# Patient Record
Sex: FEMALE | Race: WHITE | NOT HISPANIC OR LATINO | Employment: FULL TIME | URBAN - METROPOLITAN AREA
[De-identification: names, ages, dates, MRNs, and addresses within clinical notes are randomized per-mention and may not be internally consistent; named-entity substitution may affect disease eponyms.]

---

## 2017-11-09 ENCOUNTER — TRANSCRIBE ORDERS (OUTPATIENT)
Dept: PHYSICAL THERAPY | Facility: CLINIC | Age: 52
End: 2017-11-09

## 2017-11-09 DIAGNOSIS — S93.401A SPRAIN OF RIGHT ANKLE, UNSPECIFIED LIGAMENT, INITIAL ENCOUNTER: Primary | ICD-10-CM

## 2017-11-10 ENCOUNTER — TREATMENT (OUTPATIENT)
Dept: PHYSICAL THERAPY | Facility: CLINIC | Age: 52
End: 2017-11-10

## 2017-11-10 DIAGNOSIS — M25.571 ACUTE RIGHT ANKLE PAIN: Primary | ICD-10-CM

## 2017-11-10 PROCEDURE — 97112 NEUROMUSCULAR REEDUCATION: CPT | Performed by: PHYSICAL THERAPIST

## 2017-11-10 PROCEDURE — 97140 MANUAL THERAPY 1/> REGIONS: CPT | Performed by: PHYSICAL THERAPIST

## 2017-11-10 PROCEDURE — 97035 APP MDLTY 1+ULTRASOUND EA 15: CPT | Performed by: PHYSICAL THERAPIST

## 2017-11-10 PROCEDURE — 97001 PR PHYS THERAPY EVALUATION: CPT | Performed by: PHYSICAL THERAPIST

## 2017-11-10 NOTE — PROGRESS NOTES
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 She heard a pop and started to swell immediately. Having a difficulty walking, stairclimbing, and squatting    Currently wearing cam walker and ace brace.     Prior ankle injury to both of her ankles with hardware in the left.       Patient Occupation: [employer] -    Precautions and Work Restrictions: currently offQuality of life: good    Pain  Current pain ratin  At best pain ratin  Quality: sharp, throbbing and dull ache  Relieving factors: ice and medications (muscle relaxer)  Aggravating factors: repetitive movement, movement, ambulation, stairs and squatting  Progression: improved    Social Support  Lives in: one-story house (one step onto porch)  Lives with: spouse    Hand dominance: right    Diagnostic Tests  X-ray: normal    Treatments  Previous treatment: medication  Current treatment: medication and physical therapy  Patient Goals  Patient goals for therapy: increased strength, return to work, return to sport/leisure activities, independence with ADLs/IADLs, decreased pain, decreased edema, improved balance and increased motion             Objective       Palpation     Right Tenderness of the anterior tibialis, lateral gastrocnemius and posterior tibialis.     Tenderness     Right Ankle/Foot   Tenderness in the anterior talofibular ligament, fibula and peroneal tendon.     Active Range of Motion     Right Ankle/Foot   Dorsiflexion (ke): 10 degrees with pain  Plantar flexion: 10 degrees with pain  Inversion: 12 degrees with pain  Eversion: 10 degrees with pain    Joint Play     Right Ankle/Foot  Hypomobile in the fibular head, proximal tibiofibular joint, distal tibiofibular joint, talocrural joint, subtalar joint, midfoot and forefoot.      Strength/Myotome Testing     Right Ankle/Foot   Dorsiflexion: 3  Plantar flexion: 3  Inversion: 3  Eversion: 3    Tests     Additional Tests Details  No special tests performed today due to pain.     Ambulation   Weight-Bearing Status   Weight-Bearing Status (Right): weight-bearing as tolerated    Assistive device used: none    Observational Gait   Gait: antalgic and asymmetric   Decreased walking speed and stride length.          Assessment & Plan     Assessment  Impairments: abnormal or restricted ROM, activity intolerance, lacks appropriate home exercise program and pain with function  Assessment details: Patient is a 51 year old female who presents with increased right ankle pain after rolling her ankle.  Upon examination she has significantly limited ankle ROM and strength deficits secondary to pain. Anterior placement of fibular head.  Increased tenderness at ATF and fibular head. Partial weight bear during standing.   Prognosis: good  Functional Limitations: carrying objects, lifting, walking, pulling, pushing, uncomfortable because of pain and standing  Plan  Therapy options: will be seen for skilled physical therapy services  Planned modality interventions: TENS, ultrasound, thermotherapy (hydrocollator packs) and cryotherapy  Planned therapy interventions: manual therapy, soft tissue mobilization, strengthening, stretching, therapeutic activities, joint mobilization, home exercise program, functional ROM exercises, flexibility and body mechanics training  Frequency: 3x week  Duration in weeks: 4  Treatment plan discussed with: patient          Therapeutic Exercise:    10     mins  [id number];     Manual 10 minutes       Ultrasound:     8     mins  28065;      Timed Treatment:   28   mins   Total Treatment:     55   mins    PT SIGNATURE: Awa Bryan, PT   DATE TREATMENT INITIATED: 11/10/2017    Initial Certification  Certification Period: 2/8/2018  I certify that the therapy services are furnished  while this patient is under my care.  The services outlined above are required by this patient, and will be reviewed every 90 days.     PHYSICIAN: HIREN Palacio      DATE:     Please sign and return via fax to [phone].. Thank you, Psychiatric Physical Therapy.

## 2017-11-14 ENCOUNTER — TREATMENT (OUTPATIENT)
Dept: PHYSICAL THERAPY | Facility: CLINIC | Age: 52
End: 2017-11-14

## 2017-11-14 DIAGNOSIS — M25.571 ACUTE RIGHT ANKLE PAIN: Primary | ICD-10-CM

## 2017-11-14 PROCEDURE — 97140 MANUAL THERAPY 1/> REGIONS: CPT | Performed by: PHYSICAL THERAPIST

## 2017-11-14 PROCEDURE — 97110 THERAPEUTIC EXERCISES: CPT | Performed by: PHYSICAL THERAPIST

## 2017-11-14 NOTE — PROGRESS NOTES
Re-Assessment / Re-Certification        Patient: Adriana Escobar   : 1965  Diagnosis/ICD-10 Code:  Acute right ankle pain [M25.571]  Referring practitioner: HIREN Palacio  Date of Initial Visit: 2017  Today's Date: 2017  Patient seen for 2 sessions      Subjective:   Adriana Escobar reports: Patient reports that she feels about 50% better.  After initial therapy session she had a loud audible pop which then allowed her to move her foot more.   Clinical Progress: improved  Home Program Compliance: Yes  Treatment has included: therapeutic exercise, manual therapy, electrical stimulation and ultrasound    Subjective   Objective       Palpation     Right Tenderness of the anterior tibialis, lateral gastrocnemius and posterior tibialis.     Tenderness     Right Ankle/Foot   Tenderness in the anterior talofibular ligament, fibula and peroneal tendon.     Active Range of Motion     Right Ankle/Foot   Dorsiflexion (ke): with pain  Plantar flexion: 40 degrees with pain  Inversion: 20 degrees with pain  Eversion: 10 degrees with pain    Joint Play     Right Ankle/Foot  Hypomobile in the fibular head, proximal tibiofibular joint, distal tibiofibular joint, talocrural joint, subtalar joint, midfoot and forefoot.     Strength/Myotome Testing     Right Ankle/Foot   Dorsiflexion: 3+  Plantar flexion: 3+  Inversion: 3+  Eversion: 3+    Ambulation   Weight-Bearing Status   Weight-Bearing Status (Right): weight-bearing as tolerated    Assistive device used: none    Observational Gait   Gait: antalgic and asymmetric   Decreased walking speed and stride length.      Assessment & Plan     Assessment  Assessment details: Patient has tolerated treatment well thus far.  Still has significant tenderness at distal fibular head with limitations in dorsiflexion and eversion. MMT is pain inhibited.     Plan  Plan details: Would recommend to continue with physical therapy.       Progress toward previous goals: Partially  Met      PT Signature: Awa Bryan, PT        Manual Therapy:    8     mins  93099;  Therapeutic Exercise:    20     mins  08405;     Ultrasound:     8     mins  80978;      Timed Treatment:   36   mins   Total Treatment:     50   mins

## 2017-12-08 ENCOUNTER — DOCUMENTATION (OUTPATIENT)
Dept: PHYSICAL THERAPY | Facility: CLINIC | Age: 52
End: 2017-12-08

## 2017-12-08 NOTE — PROGRESS NOTES
Discharge Summary  Discharge Summary from Physical Therapy Report      Dates  PT visit: 11/10/2017-11/14/2017  Number of Visits: 2    Discharge Status of Patient: See MD Note dated 11/14/2017      Discharge Plan: Patient to return to referring/providing physician        Date of Discharge 12/08/2017        Awa Bryan, PT  Physical Therapist

## 2019-03-01 ENCOUNTER — TRANSCRIBE ORDERS (OUTPATIENT)
Dept: CARDIOLOGY | Facility: CLINIC | Age: 54
End: 2019-03-01

## 2019-03-01 DIAGNOSIS — R00.2 PALPITATIONS: Primary | ICD-10-CM

## 2022-08-01 ENCOUNTER — HOSPITAL ENCOUNTER (EMERGENCY)
Facility: HOSPITAL | Age: 57
Discharge: HOME | End: 2022-08-01
Attending: EMERGENCY MEDICINE | Admitting: EMERGENCY MEDICINE

## 2022-08-01 ENCOUNTER — APPOINTMENT (OUTPATIENT)
Dept: GENERAL RADIOLOGY | Facility: HOSPITAL | Age: 57
End: 2022-08-01

## 2022-08-01 VITALS
SYSTOLIC BLOOD PRESSURE: 102 MMHG | RESPIRATION RATE: 18 BRPM | TEMPERATURE: 97.8 F | BODY MASS INDEX: 37.04 KG/M2 | HEART RATE: 79 BPM | HEIGHT: 64 IN | OXYGEN SATURATION: 95 % | DIASTOLIC BLOOD PRESSURE: 74 MMHG | WEIGHT: 216.93 LBS

## 2022-08-01 DIAGNOSIS — M25.511 ACUTE PAIN OF RIGHT SHOULDER: ICD-10-CM

## 2022-08-01 DIAGNOSIS — S30.861A INSECT BITE OF ABDOMINAL WALL, INITIAL ENCOUNTER: ICD-10-CM
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
## 2022-08-01 DIAGNOSIS — M25.561 ACUTE PAIN OF RIGHT KNEE: Primary | ICD-10-CM
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
## 2022-08-01 DIAGNOSIS — M54.50 ACUTE BILATERAL LOW BACK PAIN WITHOUT SCIATICA: ICD-10-CM

## 2022-08-01 PROCEDURE — 96372 THER/PROPH/DIAG INJ SC/IM: CPT

## 2022-08-01 PROCEDURE — 73562 X-RAY EXAM OF KNEE 3: CPT

## 2022-08-01 PROCEDURE — 99284 EMERGENCY DEPT VISIT MOD MDM: CPT

## 2022-08-01 PROCEDURE — 72100 X-RAY EXAM L-S SPINE 2/3 VWS: CPT

## 2022-08-01 PROCEDURE — 25010000002 KETOROLAC TROMETHAMINE PER 15 MG

## 2022-08-01 PROCEDURE — 73030 X-RAY EXAM OF SHOULDER: CPT

## 2022-08-01 RX ORDER — KETOROLAC TROMETHAMINE 10 MG/1
10 TABLET, FILM COATED ORAL EVERY 6 HOURS PRN
Qty: 15 TABLET | Refills: 0 | Status: SHIPPED | OUTPATIENT
Start: 2022-08-01

## 2022-08-01 RX ORDER — CEPHALEXIN 500 MG/1
500 CAPSULE ORAL 2 TIMES DAILY
Qty: 14 CAPSULE | Refills: 0 | Status: SHIPPED | OUTPATIENT
Start: 2022-08-01 | End: 2022-08-08

## 2022-08-01 RX ORDER — HYDROCODONE BITARTRATE AND ACETAMINOPHEN 7.5; 325 MG/1; MG/1
1 TABLET ORAL ONCE
Status: COMPLETED | OUTPATIENT
Start: 2022-08-01 | End: 2022-08-01

## 2022-08-01 RX ORDER — KETOROLAC TROMETHAMINE 30 MG/ML
30 INJECTION, SOLUTION INTRAMUSCULAR; INTRAVENOUS ONCE
Status: COMPLETED | OUTPATIENT
Start: 2022-08-01 | End: 2022-08-01

## 2022-08-01 RX ADMIN — KETOROLAC TROMETHAMINE 30 MG: 30 INJECTION, SOLUTION INTRAMUSCULAR; INTRAVENOUS at 22:16

## 2022-08-01 RX ADMIN — HYDROCODONE BITARTRATE AND ACETAMINOPHEN 1 TABLET: 7.5; 325 TABLET ORAL at 20:21

## 2022-08-02 NOTE — ED PROVIDER NOTES
Time: 8:32 PM EDT  Arrived by: ambulance  Chief Complaint: Right shoulder, right knee, and back pain  History provided by: Patient and spouse  History is limited by: N/A     History of Present Illness:  Patient is a 56 y.o. year old female who presents to the emergency department with right shoulder, right knee, and low back pain after fall.    Patient presents to the emergency department accompanied by her  after a fall that occurred this afternoon.  Patient states she slipped on dog urine which caused her to fall backwards and impacted her back against a table and then continue to fall onto the floor impacting her right side.  Patient states the fall was witnessed.  Patient states she has not been able to stand since the fall due to the knee pain and back pain.  Patient denies loss of consciousness.  Patient also states that she has an area of infection on her right side that she has not had evaluated by her primary care.  Patient thinks she was bitten by a spider.  Patient states initially there was some drainage, but now it is scabbed over and is just tender to the touch.  No other complaints.      History provided by:  Patient   used: No    Fall  Mechanism of injury: fall    Injury location:  Shoulder/arm, torso and leg  Shoulder/arm injury location:  R shoulder  Torso injury location:  Back  Leg injury location:  R knee  Incident location:  Home  Arrived directly from scene: yes    Fall:     Fall occurred:  Standing    Impact surface:  Hard floor (Table)    Point of impact:  Back (Right side)    Entrapped after fall: no    Suspicion of alcohol use: no    Suspicion of drug use: no    Associated symptoms: back pain    Associated symptoms: no abdominal pain, no blindness, no chest pain, no difficulty breathing, no headaches, no hearing loss, no loss of consciousness, no nausea, no neck pain, no seizures and no vomiting        Similar Symptoms Previously: No  Recently seen:  "No      Patient Care Team  Primary Care Provider: Provider, No Known    Past Medical History:     No Known Allergies  Past Medical History:   Diagnosis Date   • Depression    • Headache    • Shortness of breath      History reviewed. No pertinent surgical history.  History reviewed. No pertinent family history.    Home Medications:  Prior to Admission medications    Not on File        Social History:   Social History     Tobacco Use   • Smoking status: Never Smoker   Substance Use Topics   • Alcohol use: No   • Drug use: No     Recent travel: no     Review of Systems:  Review of Systems   Constitutional: Negative for chills and fever.   HENT: Negative for ear pain and hearing loss.    Eyes: Negative for blindness and pain.   Respiratory: Negative for cough and shortness of breath.    Cardiovascular: Negative for chest pain.   Gastrointestinal: Negative for abdominal pain, diarrhea, nausea and vomiting.   Genitourinary: Negative for dysuria.   Musculoskeletal: Positive for back pain. Negative for arthralgias and neck pain.        Right shoulder pain, right knee pain   Skin: Negative for rash.   Neurological: Negative for seizures, loss of consciousness and headaches.        Physical Exam:  /74   Pulse 79   Temp 97.8 °F (36.6 °C) (Oral)   Resp 18   Ht 162.6 cm (64\")   Wt 98.4 kg (216 lb 14.9 oz)   SpO2 93%   BMI 37.24 kg/m²     Physical Exam  Vitals and nursing note reviewed.   Constitutional:       General: She is in acute distress (Moderate distress).      Appearance: Normal appearance.      Comments: Patient is crying in pain lying flat on exam table   HENT:      Head: Normocephalic and atraumatic.      Nose: Nose normal.   Eyes:      Extraocular Movements: Extraocular movements intact.      Conjunctiva/sclera: Conjunctivae normal.      Pupils: Pupils are equal, round, and reactive to light.   Cardiovascular:      Rate and Rhythm: Normal rate and regular rhythm.      Heart sounds: Normal heart sounds. "   Pulmonary:      Effort: Pulmonary effort is normal.      Breath sounds: Normal breath sounds.   Abdominal:      General: Abdomen is flat. Bowel sounds are normal. There is no distension.      Palpations: Abdomen is soft.      Tenderness: There is no abdominal tenderness. There is no guarding.       Musculoskeletal:         General: Normal range of motion.        Arms:       Cervical back: Normal range of motion and neck supple.        Legs:    Skin:     General: Skin is warm and dry.   Neurological:      General: No focal deficit present.      Mental Status: She is alert and oriented to person, place, and time.   Psychiatric:         Mood and Affect: Mood normal.         Behavior: Behavior normal.         Thought Content: Thought content normal.         Judgment: Judgment normal.                Medications in the Emergency Department:  Medications   ketorolac (TORADOL) injection 30 mg (has no administration in time range)   HYDROcodone-acetaminophen (NORCO) 7.5-325 MG per tablet 1 tablet (1 tablet Oral Given 8/1/22 2021)        Labs  Lab Results (last 24 hours)     ** No results found for the last 24 hours. **           Imaging:  XR Spine Lumbar 2 or 3 View    Result Date: 8/1/2022  PROCEDURE: XR SPINE LUMBAR 2 OR 3 VW  COMPARISON: None  INDICATIONS: generalized lower back pain after fall today  FINDINGS:  No fracture of the lumbar spine.  No significant malalignment.  Degenerative changes seen at L1-2 and L2-3.  Facet arthropathy lower lumbar spine        1. No acute fracture of the lumbar spine.  Degenerative changes in the upper lumbar spine.     SHERRY MESA MD       Electronically Signed and Approved By: SHERRY MESA MD on 8/01/2022 at 21:30             XR Shoulder 2+ View Right    Result Date: 8/1/2022  PROCEDURE: XR SHOULDER 2+ VW RIGHT  COMPARISON: None  INDICATIONS: generalized right shoulder pain after fall today  FINDINGS:  No fracture or malalignment is seen.  No obvious soft tissue finding.   Mild degenerative changes.         1. No acute fracture or malalignment      SHERRY MESA MD       Electronically Signed and Approved By: SHERRY MESA MD on 8/01/2022 at 21:34             XR Knee 3 View Right    Result Date: 8/1/2022  PROCEDURE: XR KNEE 3 VW RIGHT  COMPARISON: None  INDICATIONS: generalized right knee pain after fall today  FINDINGS:  Three views of the right knee demonstrates no acute fracture.  No malalignment.  No significant joint effusion.        1. No acute fracture      SHERRY MESA MD       Electronically Signed and Approved By: SHERRY MESA MD on 8/01/2022 at 21:32               Procedures:  Procedures    Progress  ED Course as of 08/01/22 2205   Mon Aug 01, 2022   2148 Patient states her pain in the right shoulder and back are improved but she is having continued pain in the right knee. [MD]      ED Course User Index  [MD] Ryan Wilson PA-C                            Medical Decision Making:  MDM  Number of Diagnoses or Management Options  Diagnosis management comments: I have spoken with patient. I have explained the patient´s condition, diagnoses and treatment plan based on the information available to me at this time. I have answered the patient's questions and addressed any concerns. The patient has a good  understanding of the patient´s diagnosis, condition, and treatment plan as can be expected at this point. The vital signs have been stable. The patient´s condition is stable and appropriate for discharge from the emergency department.      The patient will pursue further outpatient evaluation with the primary care physician or other designated or consulting physician as outlined in the discharge instructions. They are agreeable to this plan of care and follow-up instructions have been explained in detail. The patient has received these instructions in written format and have expressed an understanding of the discharge instructions. The patient is aware that  any significant change in condition or worsening of symptoms should prompt an immediate return to this or the closest emergency department or call to 911.       Amount and/or Complexity of Data Reviewed  Tests in the radiology section of CPT®: ordered and reviewed    Risk of Complications, Morbidity, and/or Mortality  Presenting problems: moderate  Diagnostic procedures: low  Management options: low    Patient Progress  Patient progress: stable       Final diagnoses:   Acute pain of right knee   Acute pain of right shoulder   Acute bilateral low back pain without sciatica   Insect bite of abdominal wall, initial encounter   Fall, initial encounter        Disposition:  ED Disposition     ED Disposition   Discharge    Condition   Stable    Comment   --             This medical record created using voice recognition software.           Ryan Wilson PA-C  08/01/22 7208

## 2022-08-02 NOTE — DISCHARGE INSTRUCTIONS
Please ensure that you take the full course of antibiotics as prescribed.  Take Toradol as needed for pain control.  Apply ice to the areas of pain as need 15-20 minutes a day 4-5 times per day. Wear knee sleeve as needed for support of your right knee.

## 2023-11-27 ENCOUNTER — HOSPITAL ENCOUNTER (EMERGENCY)
Facility: HOSPITAL | Age: 58
Discharge: HOME | End: 2023-11-27
Attending: EMERGENCY MEDICINE | Admitting: EMERGENCY MEDICINE
Payer: COMMERCIAL

## 2023-11-27 VITALS
RESPIRATION RATE: 16 BRPM | OXYGEN SATURATION: 98 % | HEART RATE: 80 BPM | SYSTOLIC BLOOD PRESSURE: 141 MMHG | TEMPERATURE: 97.1 F | DIASTOLIC BLOOD PRESSURE: 70 MMHG

## 2023-11-27 DIAGNOSIS — L02.416 ABSCESS OF LEFT THIGH: Primary | ICD-10-CM

## 2023-11-27 PROCEDURE — 99282 EMERGENCY DEPT VISIT SF MDM: CPT

## 2023-11-27 RX ORDER — LIDOCAINE HYDROCHLORIDE AND EPINEPHRINE 10; 10 MG/ML; UG/ML
10 INJECTION, SOLUTION INFILTRATION; PERINEURAL ONCE
Status: COMPLETED | OUTPATIENT
Start: 2023-11-27 | End: 2023-11-27

## 2023-11-27 RX ORDER — SULFAMETHOXAZOLE AND TRIMETHOPRIM 800; 160 MG/1; MG/1
1 TABLET ORAL 2 TIMES DAILY
Qty: 14 TABLET | Refills: 0 | Status: SHIPPED | OUTPATIENT
Start: 2023-11-27

## 2023-11-27 RX ADMIN — LIDOCAINE HYDROCHLORIDE,EPINEPHRINE BITARTRATE 10 ML: 10; .01 INJECTION, SOLUTION INFILTRATION; PERINEURAL at 15:24

## 2023-11-27 NOTE — DISCHARGE INSTRUCTIONS
Although you are being discharged from the ED today, I encourage you to return for worsening symptoms. Things can, and do, change such that treatment at home with medication may not be adequate. Specifically I recommend returning for worsening pain or redness, increased drainage, fever, chills, or any other worsening or alarming symptoms.     Rest.  Keep wound clean with antibacterial soap and water daily and keep dressing in place.  Follow up with primary care provider for further management and to have blood pressure rechecked.  Take your medications as prescribed.  Finish all antibiotics as prescribed.

## 2023-11-27 NOTE — Clinical Note
[facility] EMERGENCY DEPARTMENT  [address]  [facility] [address]  Phone: [phone]    Adriana MCNULTY was seen and treated in our emergency department on 11/27/2023.  She may return to work on 11/28/2023.         Thank you for choosing [facility].    Dani Nava MD

## 2023-11-27 NOTE — ED PROVIDER NOTES
EMERGENCY DEPARTMENT ENCOUNTER    Room Number:  T02/02  PCP: Augusto Boykin MD  Discussed/ obtained information from independent historians: patient      HPI:  Chief Complaint: thigh wound  Context: Adriana MCNULTY is a 57 y.o. female who presents to the ED c/o a wound on her right thigh that she believes may be an insect bite.  Patient states she did not see anything bite her but does admit she has a lot of spiders in her home.  She states she noticed some discomfort in the area initially on Wednesday of last week.  She says the pain then got worse and worse.  She says she noticed the area drained some pus and blood a couple days ago as well.  She has not seen any more drainage but states it has gotten really red and sore around the area.  She denies any fever or chills.  She is not diabetic.      PAST MEDICAL HISTORY  Active Ambulatory Problems     Diagnosis Date Noted    No Active Ambulatory Problems     Resolved Ambulatory Problems     Diagnosis Date Noted    No Resolved Ambulatory Problems     Past Medical History:   Diagnosis Date    Depression     Headache     Shortness of breath          PAST SURGICAL HISTORY  No past surgical history on file.      FAMILY HISTORY  No family history on file.      SOCIAL HISTORY  Social History     Socioeconomic History    Marital status:    Tobacco Use    Smoking status: Never   Substance and Sexual Activity    Alcohol use: No    Drug use: No         ALLERGIES  Patient has no known allergies.        REVIEW OF SYSTEMS  Review of Systems   Constitutional:  Negative for chills and fever.   Respiratory:  Negative for shortness of breath.    Cardiovascular:  Negative for chest pain and leg swelling.   Gastrointestinal:  Negative for nausea and vomiting.   Musculoskeletal:  Negative for arthralgias, back pain and joint swelling.   Skin:  Positive for color change and wound.   Neurological:  Negative for numbness.          PHYSICAL EXAM  ED Triage Vitals   Temp Heart  Rate Resp BP SpO2   11/27/23 1336 11/27/23 1336 11/27/23 1336 11/27/23 1349 11/27/23 1336   97.1 °F (36.2 °C) 83 16 155/76 97 %      Temp src Heart Rate Source Patient Position BP Location FiO2 (%)   11/27/23 1336 11/27/23 1336 -- -- --   Tympanic Monitor          Physical Exam      GENERAL: no acute distress  HENT: normocephalic, atraumatic  EYES: no scleral icterus  CV: regular rhythm, normal rate  RESPIRATORY: normal effort  ABDOMEN: nondistended  MUSCULOSKELETAL: no deformity  NEURO: alert, moves all extremities, follows commands  PSYCH:  calm, cooperative  SKIN: warm, dry. 3x3 fluctuant, erythematous, warm mass with necrotic appearing center on left medial thigh. No surrounding streaking    Vital signs and nursing notes reviewed.        PROCEDURES  Incision & Drainage    Date/Time: 11/28/2023 9:27 PM    Performed by: Cammie De Leon PA  Authorized by: Dani Nava MD    Consent:     Consent obtained:  Verbal    Consent given by:  Patient    Risks, benefits, and alternatives were discussed: yes      Risks discussed:  Bleeding, pain, incomplete drainage and infection    Alternatives discussed:  No treatment  Universal protocol:     Procedure explained and questions answered to patient or proxy's satisfaction: yes      Patient identity confirmed:  Verbally with patient  Location:     Type:  Abscess    Size:  3    Location:  Lower extremity    Lower extremity location:  Leg    Leg location:  L upper leg  Pre-procedure details:     Skin preparation:  Povidone-iodine  Sedation:     Sedation type:  None  Anesthesia:     Anesthesia method:  Local infiltration    Local anesthetic:  Lidocaine 1% WITH epi  Procedure type:     Complexity:  Complex  Procedure details:     Incision types:  Stab incision    Incision depth:  Subcutaneous    Wound management:  Probed and deloculated, irrigated with saline and extensive cleaning    Drainage:  Bloody and purulent    Drainage amount:  Moderate    Wound treatment:  Wound  left open    Packing materials:  None  Post-procedure details:     Procedure completion:  Tolerated        MEDICATIONS GIVEN IN ER  Medications   lidocaine 1% - EPINEPHrine 1:678366 (XYLOCAINE W/EPI) 1 %-1:841465 injection 10 mL (10 mL Injection Given 11/27/23 1524)       MEDICAL DECISION MAKING, PROGRESS, and CONSULTS     Discussion below represents my analysis of pertinent findings related to patient's condition, differential diagnosis, treatment plan and final disposition.      Orders placed during this visit:  Orders Placed This Encounter   Procedures    Supplies To Bedside - Notify MD When Ready- I&D Tray / Kit; Sterile Gloves: 6.5           Differential diagnosis:  Abscess, cellulitis, insect bite, folliculitis, foreign body      Independent interpretation of labs, radiology studies, and discussions with consultants:  ED Course as of 11/28/23 2132   Mon Nov 27, 2023   1630 Patient tolerated I&D well.  Recommended keeping site clean and dry with soap and water daily and covering.  Will e-scribe oral antibiotics and counseled on finishing full course. Gave return precautions for worsening redness, warmth, drainage, or development of fever or chills while on antibiotics.  Pt understands and agrees to plan of care. [AH]      ED Course User Index  [AH] Cammie De Leon PA       - Shared decision making:  Pt agrees with plan for discharge and outpatient follow up      DIAGNOSIS  Final diagnoses:   Abscess of left thigh           Follow Up:  Augusto Boykin MD  [address] [address]  [phone]    Schedule an appointment as soon as possible for a visit   For wound re-check      RX:     Medication List        New Prescriptions      sulfamethoxazole-trimethoprim 800-160 MG per tablet  Commonly known as: BACTRIM DS,SEPTRA DS  Take 1 tablet by mouth 2 (Two) Times a Day.               Where to Get Your Medications        These medications were sent to [id number] -  [place], [place] - [address] AT [address] [place] [address] - [phone]  - [phone]   [address], [place] [address]      Phone: [phone]   sulfamethoxazole-trimethoprim 800-160 MG per tablet         Latest Documented Vital Signs:  As of 21:01 EST  BP- 141/70 HR- 80 Temp- 97.1 °F (36.2 °C) (Tympanic) O2 sat- 98%              --    Please note that portions of this were completed with a voice recognition program.       Note Disclaimer: At [facility], we believe that sharing information builds trust and better relationships. You are receiving this note because you are receiving care at [facility] or recently visited. It is possible you will see health information before a provider has talked with you about it. This kind of information can be easy to misunderstand. To help you fully understand what it means for your health, we urge you to discuss this note with your provider.             Cammie De Leon PA  11/28/23 2131

## 2023-11-27 NOTE — ED PROVIDER NOTES
MD ATTESTATION NOTE    The MEHRAN and I have discussed this patient's history, physical exam, and treatment plan.  I have reviewed the documentation and personally had a face to face interaction with the patient. I affirm the documentation and agree with the treatment and plan.  The attached note describes my personal findings.      I provided a substantive portion of the care of the patient.  I personally performed the physical exam in its entirety, and below are my findings.      Brief HPI: Patient complains of a wound on her left thigh.  She initially noticed it several days ago and thought it could be an insect bite.  She had a small amount of drainage from the wound.  Denies fever or chills.  She is not a diabetic.    PHYSICAL EXAM  ED Triage Vitals   Temp Heart Rate Resp BP SpO2   11/27/23 1336 11/27/23 1336 11/27/23 1336 11/27/23 1349 11/27/23 1336   97.1 °F (36.2 °C) 83 16 155/76 97 %      Temp src Heart Rate Source Patient Position BP Location FiO2 (%)   11/27/23 1336 11/27/23 1336 -- -- --   Tympanic Monitor            GENERAL: Awake, alert, oriented x3.  Nontoxic-appearing female.  Resting comfortably in no acute distress  HENT: nares patent  EYES: no scleral icterus  CV: regular rhythm, normal rate  RESPIRATORY: normal effort, clear to auscultation bilaterally  ABDOMEN: soft, nontender  MUSCULOSKELETAL: no deformity  NEURO: alert, moves all extremities, follows commands  PSYCH:  calm, cooperative  SKIN: There is a small wound on the medial aspect of the left thigh.  There is surrounding tenderness and induration.    Vital signs and nursing notes reviewed.        Plan: Incision and drainage    I&D was performed by the PA.  Patient will be discharged on oral antibiotics.       Dani Nava MD  11/27/23 7815

## 2025-04-28 ENCOUNTER — HOSPITAL ENCOUNTER (EMERGENCY)
Facility: HOSPITAL | Age: 60
Discharge: HOME | End: 2025-04-28
Attending: EMERGENCY MEDICINE | Admitting: EMERGENCY MEDICINE
Payer: COMMERCIAL

## 2025-04-28 VITALS
OXYGEN SATURATION: 97 % | SYSTOLIC BLOOD PRESSURE: 133 MMHG | HEART RATE: 72 BPM | TEMPERATURE: 97.5 F | DIASTOLIC BLOOD PRESSURE: 91 MMHG | RESPIRATION RATE: 16 BRPM

## 2025-04-28 DIAGNOSIS — Z79.4 TYPE 2 DIABETES MELLITUS WITH HYPERGLYCEMIA, WITH LONG-TERM CURRENT USE OF INSULIN: Primary | ICD-10-CM

## 2025-04-28 DIAGNOSIS — R42 DIZZINESS: ICD-10-CM

## 2025-04-28 DIAGNOSIS — E11.65 TYPE 2 DIABETES MELLITUS WITH HYPERGLYCEMIA, WITH LONG-TERM CURRENT USE OF INSULIN: Primary | ICD-10-CM

## 2025-04-28 LAB
ALBUMIN SERPL-MCNC: 4.3 G/DL (ref 3.5–5.2)
ALBUMIN/GLOB SERPL: 1.5 G/DL
ALP SERPL-CCNC: 136 U/L (ref 39–117)
ALT SERPL W P-5'-P-CCNC: 19 U/L (ref 1–33)
ANION GAP SERPL CALCULATED.3IONS-SCNC: 12 MMOL/L (ref 5–15)
AST SERPL-CCNC: 19 U/L (ref 1–32)
BASOPHILS # BLD AUTO: 0.03 10*3/MM3 (ref 0–0.2)
BASOPHILS NFR BLD AUTO: 0.5 % (ref 0–1.5)
BILIRUB SERPL-MCNC: 0.9 MG/DL (ref 0–1.2)
BILIRUB UR QL STRIP: NEGATIVE
BUN SERPL-MCNC: 11 MG/DL (ref 6–20)
BUN/CREAT SERPL: 17.2 (ref 7–25)
CALCIUM SPEC-SCNC: 9.3 MG/DL (ref 8.6–10.5)
CHLORIDE SERPL-SCNC: 101 MMOL/L (ref 98–107)
CLARITY UR: CLEAR
CO2 SERPL-SCNC: 26 MMOL/L (ref 22–29)
COLOR UR: YELLOW
CREAT SERPL-MCNC: 0.64 MG/DL (ref 0.57–1)
DEPRECATED RDW RBC AUTO: 40.9 FL (ref 37–54)
EGFRCR SERPLBLD CKD-EPI 2021: 103.2 ML/MIN/1.73
EOSINOPHIL # BLD AUTO: 0.36 10*3/MM3 (ref 0–0.4)
EOSINOPHIL NFR BLD AUTO: 5.6 % (ref 0.3–6.2)
ERYTHROCYTE [DISTWIDTH] IN BLOOD BY AUTOMATED COUNT: 12.8 % (ref 12.3–15.4)
GLOBULIN UR ELPH-MCNC: 2.9 GM/DL
GLUCOSE BLDC GLUCOMTR-MCNC: 262 MG/DL (ref 70–130)
GLUCOSE BLDC GLUCOMTR-MCNC: 309 MG/DL (ref 70–130)
GLUCOSE SERPL-MCNC: 350 MG/DL (ref 65–99)
GLUCOSE UR STRIP-MCNC: ABNORMAL MG/DL
HCT VFR BLD AUTO: 43.9 % (ref 34–46.6)
HGB BLD-MCNC: 14.5 G/DL (ref 12–15.9)
HGB UR QL STRIP.AUTO: NEGATIVE
HOLD SPECIMEN: NORMAL
HOLD SPECIMEN: NORMAL
IMM GRANULOCYTES # BLD AUTO: 0.01 10*3/MM3 (ref 0–0.05)
IMM GRANULOCYTES NFR BLD AUTO: 0.2 % (ref 0–0.5)
KETONES UR QL STRIP: NEGATIVE
LEUKOCYTE ESTERASE UR QL STRIP.AUTO: NEGATIVE
LYMPHOCYTES # BLD AUTO: 2.44 10*3/MM3 (ref 0.7–3.1)
LYMPHOCYTES NFR BLD AUTO: 37.9 % (ref 19.6–45.3)
MCH RBC QN AUTO: 29 PG (ref 26.6–33)
MCHC RBC AUTO-ENTMCNC: 33 G/DL (ref 31.5–35.7)
MCV RBC AUTO: 87.8 FL (ref 79–97)
MONOCYTES # BLD AUTO: 0.35 10*3/MM3 (ref 0.1–0.9)
MONOCYTES NFR BLD AUTO: 5.4 % (ref 5–12)
NEUTROPHILS NFR BLD AUTO: 3.25 10*3/MM3 (ref 1.7–7)
NEUTROPHILS NFR BLD AUTO: 50.4 % (ref 42.7–76)
NITRITE UR QL STRIP: NEGATIVE
NRBC BLD AUTO-RTO: 0 /100 WBC (ref 0–0.2)
PH UR STRIP.AUTO: 6 [PH] (ref 5–8)
PLATELET # BLD AUTO: 236 10*3/MM3 (ref 140–450)
PMV BLD AUTO: 10.6 FL (ref 6–12)
POTASSIUM SERPL-SCNC: 3.9 MMOL/L (ref 3.5–5.2)
PROT SERPL-MCNC: 7.2 G/DL (ref 6–8.5)
PROT UR QL STRIP: NEGATIVE
RBC # BLD AUTO: 5 10*6/MM3 (ref 3.77–5.28)
SODIUM SERPL-SCNC: 139 MMOL/L (ref 136–145)
SP GR UR STRIP: >1.03 (ref 1–1.03)
UROBILINOGEN UR QL STRIP: ABNORMAL
WBC NRBC COR # BLD AUTO: 6.44 10*3/MM3 (ref 3.4–10.8)
WHOLE BLOOD HOLD COAG: NORMAL
WHOLE BLOOD HOLD SPECIMEN: NORMAL

## 2025-04-28 PROCEDURE — 82948 REAGENT STRIP/BLOOD GLUCOSE: CPT

## 2025-04-28 PROCEDURE — 81003 URINALYSIS AUTO W/O SCOPE: CPT | Performed by: EMERGENCY MEDICINE

## 2025-04-28 PROCEDURE — 99283 EMERGENCY DEPT VISIT LOW MDM: CPT

## 2025-04-28 PROCEDURE — 85025 COMPLETE CBC W/AUTO DIFF WBC: CPT | Performed by: EMERGENCY MEDICINE

## 2025-04-28 PROCEDURE — 80053 COMPREHEN METABOLIC PANEL: CPT | Performed by: EMERGENCY MEDICINE

## 2025-04-28 PROCEDURE — 36415 COLL VENOUS BLD VENIPUNCTURE: CPT

## 2025-04-28 PROCEDURE — 25810000003 LACTATED RINGERS SOLUTION: Performed by: EMERGENCY MEDICINE

## 2025-04-28 PROCEDURE — 93005 ELECTROCARDIOGRAM TRACING: CPT | Performed by: EMERGENCY MEDICINE

## 2025-04-28 RX ORDER — MECLIZINE HYDROCHLORIDE 25 MG/1
25 TABLET ORAL 3 TIMES DAILY PRN
Qty: 15 TABLET | Refills: 0 | Status: SHIPPED | OUTPATIENT
Start: 2025-04-28

## 2025-04-28 RX ORDER — SODIUM CHLORIDE 0.9 % (FLUSH) 0.9 %
10 SYRINGE (ML) INJECTION AS NEEDED
Status: DISCONTINUED | OUTPATIENT
Start: 2025-04-28 | End: 2025-04-29 | Stop reason: HOSPADM

## 2025-04-28 RX ORDER — MECLIZINE HYDROCHLORIDE 25 MG/1
25 TABLET ORAL ONCE
Status: COMPLETED | OUTPATIENT
Start: 2025-04-28 | End: 2025-04-28

## 2025-04-28 RX ADMIN — MECLIZINE HYDROCHLORIDE 25 MG: 25 TABLET ORAL at 18:46

## 2025-04-28 RX ADMIN — SODIUM CHLORIDE, POTASSIUM CHLORIDE, SODIUM LACTATE AND CALCIUM CHLORIDE 1000 ML: 600; 310; 30; 20 INJECTION, SOLUTION INTRAVENOUS at 18:52

## 2025-04-28 NOTE — ED NOTES
Pt complains of feeling dizzy since last night and and pain this morning. Reports that her blood sugar has been elevated as well.

## 2025-04-28 NOTE — Clinical Note
[facility] EMERGENCY DEPARTMENT  [address] [facility] [address]  Phone: [phone]    Adriana MCNULTY was seen and treated in our emergency department on 4/28/2025.  She may return to work on 04/30/2025.         Thank you for choosing [facility].    Mago Lal RN

## 2025-04-28 NOTE — ED PROVIDER NOTES
" EMERGENCY DEPARTMENT ENCOUNTER    Room Number:  30/30  PCP: Augusto Boykin MD  Historian: Patient    I initially evaluated the patient at 1817    HPI:  Chief Complaint: Elevated blood sugar, dizziness  A complete HPI/ROS/PMH/PSH/SH/FH are unobtainable due to: Nothing  Context: Adriana MCNULTY is a 59 y.o. female with a medical history of type II diabetes who presents to the ED c/o acute dizziness and elevated blood sugar.  Patient was diagnosed with diabetes about a year ago.  She was initially on insulin but stopped taking it after several months because she did not have health insurance.  She was started back on insulin in January.  She takes Lantus and Basaglar.  She reports that her blood sugar is \"always high\".  She says it ranges from 200-500 on a regular basis.  She has had increased urinary frequency.  She also complains of intermittent dizziness for the past several days.  Dizziness is worse with movement and better when she is still.  She had 2 episodes of vomiting this morning.  Denies headache, vision changes, chest pain, shortness of breath, abdominal pain, diarrhea, slurred speech, or numbness/tingling/weakness in her extremities.            PAST MEDICAL HISTORY  Active Ambulatory Problems     Diagnosis Date Noted    No Active Ambulatory Problems     Resolved Ambulatory Problems     Diagnosis Date Noted    No Resolved Ambulatory Problems     Past Medical History:   Diagnosis Date    Depression     Headache     Shortness of breath          PAST SURGICAL HISTORY  History reviewed. No pertinent surgical history.      FAMILY HISTORY  History reviewed. No pertinent family history.      SOCIAL HISTORY  Social History     Socioeconomic History    Marital status:    Tobacco Use    Smoking status: Never   Substance and Sexual Activity    Alcohol use: No    Drug use: No         ALLERGIES  Patient has no known allergies.    REVIEW OF SYSTEMS  Review of Systems  Included in HPI  All systems reviewed " and negative except for those discussed in HPI.      PHYSICAL EXAM  ED Triage Vitals   Temp Heart Rate Resp BP SpO2   04/28/25 1538 04/28/25 1538 04/28/25 1538 04/28/25 1544 04/28/25 1538   97.5 °F (36.4 °C) 72 16 133/91 97 %      Temp src Heart Rate Source Patient Position BP Location FiO2 (%)   04/28/25 1538 04/28/25 1538 04/28/25 1544 -- --   Tympanic Monitor Sitting         Physical Exam      GENERAL: Awake, alert, oriented x 3.  Nontoxic-appearing female.  Appears older than stated age.  Resting comfortably in no acute distress  HENT: NCAT, nares patent, moist mucous membrane  EYES: PERRL, EOMI, no nystagmus  CV: regular rhythm, normal rate  RESPIRATORY: normal effort, clear to auscultation bilaterally  ABDOMEN: soft, nontender  MUSCULOSKELETAL: Extremities are nontender with full range of motion  NEURO: Speech is clear and fluent.  No aphasia.  Tongue protrudes midline.  No facial droop.  Normal strength and light touch sensation in all extremities.  Normal finger-to-nose and heel-to-shin testing bilaterally.  PSYCH:  calm, cooperative  SKIN: warm, dry    Vital signs and nursing notes reviewed.          LAB RESULTS  Recent Results (from the past 24 hours)   POC Glucose Once    Collection Time: 04/28/25  3:44 PM    Specimen: Blood   Result Value Ref Range    Glucose 309 (H) 70 - 130 mg/dL   Comprehensive Metabolic Panel    Collection Time: 04/28/25  3:51 PM    Specimen: Blood   Result Value Ref Range    Glucose 350 (H) 65 - 99 mg/dL    BUN 11 6 - 20 mg/dL    Creatinine 0.64 0.57 - 1.00 mg/dL    Sodium 139 136 - 145 mmol/L    Potassium 3.9 3.5 - 5.2 mmol/L    Chloride 101 98 - 107 mmol/L    CO2 26.0 22.0 - 29.0 mmol/L    Calcium 9.3 8.6 - 10.5 mg/dL    Total Protein 7.2 6.0 - 8.5 g/dL    Albumin 4.3 3.5 - 5.2 g/dL    ALT (SGPT) 19 1 - 33 U/L    AST (SGOT) 19 1 - 32 U/L    Alkaline Phosphatase 136 (H) 39 - 117 U/L    Total Bilirubin 0.9 0.0 - 1.2 mg/dL    Globulin 2.9 gm/dL    A/G Ratio 1.5 g/dL    BUN/Creatinine  Ratio 17.2 7.0 - 25.0    Anion Gap 12.0 5.0 - 15.0 mmol/L    eGFR 103.2 >60.0 mL/min/1.73   Green Top (Gel)    Collection Time: 04/28/25  3:51 PM   Result Value Ref Range    Extra Tube Hold for add-ons.    Lavender Top    Collection Time: 04/28/25  3:51 PM   Result Value Ref Range    Extra Tube hold for add-on    Gold Top - SST    Collection Time: 04/28/25  3:51 PM   Result Value Ref Range    Extra Tube Hold for add-ons.    Light Blue Top    Collection Time: 04/28/25  3:51 PM   Result Value Ref Range    Extra Tube Hold for add-ons.    CBC Auto Differential    Collection Time: 04/28/25  3:51 PM    Specimen: Blood   Result Value Ref Range    WBC 6.44 3.40 - 10.80 10*3/mm3    RBC 5.00 3.77 - 5.28 10*6/mm3    Hemoglobin 14.5 12.0 - 15.9 g/dL    Hematocrit 43.9 34.0 - 46.6 %    MCV 87.8 79.0 - 97.0 fL    MCH 29.0 26.6 - 33.0 pg    MCHC 33.0 31.5 - 35.7 g/dL    RDW 12.8 12.3 - 15.4 %    RDW-SD 40.9 37.0 - 54.0 fl    MPV 10.6 6.0 - 12.0 fL    Platelets 236 140 - 450 10*3/mm3    Neutrophil % 50.4 42.7 - 76.0 %    Lymphocyte % 37.9 19.6 - 45.3 %    Monocyte % 5.4 5.0 - 12.0 %    Eosinophil % 5.6 0.3 - 6.2 %    Basophil % 0.5 0.0 - 1.5 %    Immature Grans % 0.2 0.0 - 0.5 %    Neutrophils, Absolute 3.25 1.70 - 7.00 10*3/mm3    Lymphocytes, Absolute 2.44 0.70 - 3.10 10*3/mm3    Monocytes, Absolute 0.35 0.10 - 0.90 10*3/mm3    Eosinophils, Absolute 0.36 0.00 - 0.40 10*3/mm3    Basophils, Absolute 0.03 0.00 - 0.20 10*3/mm3    Immature Grans, Absolute 0.01 0.00 - 0.05 10*3/mm3    nRBC 0.0 0.0 - 0.2 /100 WBC   ECG 12 Lead Other; Dizziness    Collection Time: 04/28/25  6:35 PM   Result Value Ref Range    QT Interval 416 ms    QTC Interval 426 ms   Urinalysis With Microscopic If Indicated (No Culture) - Urine, Clean Catch    Collection Time: 04/28/25  7:00 PM    Specimen: Urine, Clean Catch   Result Value Ref Range    Color, UA Yellow Yellow, Straw    Appearance, UA Clear Clear    pH, UA 6.0 5.0 - 8.0    Specific Gravity, UA >1.030  (H) 1.005 - 1.030    Glucose, UA >=1000 mg/dL (3+) (A) Negative    Ketones, UA Negative Negative    Bilirubin, UA Negative Negative    Blood, UA Negative Negative    Protein, UA Negative Negative    Leuk Esterase, UA Negative Negative    Nitrite, UA Negative Negative    Urobilinogen, UA 1.0 E.U./dL 0.2 - 1.0 E.U./dL   POC Glucose Once    Collection Time: 04/28/25  8:07 PM    Specimen: Blood   Result Value Ref Range    Glucose 262 (H) 70 - 130 mg/dL       Ordered the above labs and reviewed the results.        RADIOLOGY  No Radiology Exams Resulted Within Past 24 Hours    Ordered the above noted radiological studies. Reviewed by me in PACS.            PROCEDURES  Procedures        OUTPATIENT MEDICATION MANAGEMENT:  Current Facility-Administered Medications Ordered in Epic   Medication Dose Route Frequency Provider Last Rate Last Admin    sodium chloride 0.9 % flush 10 mL  10 mL Intravenous PRN Dani Nava MD        sodium chloride 0.9 % flush 10 mL  10 mL Intravenous PRN Dani Nava MD         Current Outpatient Medications Ordered in Epic   Medication Sig Dispense Refill    ketorolac (TORADOL) 10 MG tablet Take 1 tablet by mouth Every 6 (Six) Hours As Needed for Moderate Pain . 15 tablet 0    meclizine (ANTIVERT) 25 MG tablet Take 1 tablet by mouth 3 (Three) Times a Day As Needed for Dizziness. 15 tablet 0    sulfamethoxazole-trimethoprim (BACTRIM DS,SEPTRA DS) 800-160 MG per tablet Take 1 tablet by mouth 2 (Two) Times a Day. 14 tablet 0           MEDICATIONS GIVEN IN ER  Medications   sodium chloride 0.9 % flush 10 mL (has no administration in time range)   sodium chloride 0.9 % flush 10 mL (has no administration in time range)   lactated ringers bolus 1,000 mL (0 mL Intravenous Stopped 4/28/25 1922)   meclizine (ANTIVERT) tablet 25 mg (25 mg Oral Given 4/28/25 1846)                   MEDICAL DECISION MAKING, PROGRESS, and CONSULTS    All labs have been independently reviewed by me.  All radiology  studies have been reviewed by me and I have also reviewed the radiology report.   EKG's independently viewed and interpreted by me.  Discussion below represents my analysis of pertinent findings related to patient's condition, differential diagnosis, treatment plan and final disposition.      Additional sources:    - Discussed/ obtained information from independent historians: None    - External (non-ED) record review: Patient was seen at another ER in April 2023 for upper abdominal pain.  Gallbladder ultrasound showed possible sludge versus artifact.  She does not have any prior admissions here.    -Prescription drug monitoring program review:     N/A    - Chronic or social conditions impacting patient care (Social Determinants of Health): None          Orders placed during this visit:  Orders Placed This Encounter   Procedures    Ola Draw    Comprehensive Metabolic Panel    Urinalysis With Microscopic If Indicated (No Culture) - Urine, Clean Catch    CBC Auto Differential    NPO Diet NPO Type: Strict NPO    Undress & Gown    Continuous Pulse Oximetry    Vital Signs    Ambulate Patient - Report tolerance to provider    Oxygen Therapy- Nasal Cannula; Titrate 1-6 LPM Per SpO2; 90 - 95%    POC Glucose Once    POC Glucose Once    POC Glucose Once    ECG 12 Lead Other; Dizziness    Insert Peripheral IV    Insert Peripheral IV    CBC & Differential    Green Top (Gel)    Lavender Top    Gold Top - SST    Light Blue Top         Additional orders considered but not ordered:          Differential diagnosis includes, but is not limited to:    BPPV, lab arthritis, vestibular neuritis, electrolyte abnormality, dehydration      Independent interpretation of labs, radiology studies, and discussions with consultants:  ED Course as of 04/28/25 2315 Mon Apr 28, 2025 1816 BP: 133/91 [WH]   1816 Temp: 97.5 °F (36.4 °C) [WH]   1816 Heart Rate: 72 [WH]   1816 Resp: 16 [WH]   1816 SpO2: 97 % [WH]   1816 Device (Oxygen Therapy):  room air [WH]   1824 Glucose(!): 350 [WH]   1824 BUN: 11 [WH]   1824 Creatinine: 0.64 [WH]   1824 Sodium: 139 [WH]   1824 Potassium: 3.9 [WH]   1824 CO2: 26.0 [WH]   1824 Anion Gap: 12.0 [WH]   1824 WBC: 6.44 [WH]   1824 Hemoglobin: 14.5 [WH]   1843 EKG interpreted by me at 1840.  My personal interpretation is:         EKG time: 1835  Rhythm/Rate: Sinus rhythm, rate 63  P waves and NH: Normal  QRS, axis: Normal  ST and T waves: Normal    Interpreted Contemporaneously by me, independently viewed  No prior available for comparison    [WH]   2011 Glucose(!): 262 [WH]   2116 Glucose(!): >=1000 mg/dL (3+) [WH]   2116 Leukocytes, UA: Negative [WH]   2116 Nitrite, UA: Negative [WH]   2203 Test results and diagnoses were discussed with the patient and her family.  She was able to ambulate without assistance or difficulty.  She is comfortable going home.  I recommended that she check and record her blood sugar regularly and follow-up with her PCP to have her insulin doses adjusted.  All questions were answered.  Return precautions were discussed. [WH]   2214 MDM: Patient presented to the ED complaining of intermittent dizziness and elevated blood sugar.  Blood sugar has been running high for the past few months.  She was started on insulin in January.  Here in the ED, glucose was initially 350 but improved to 262 with IV fluids.  CO2 was normal.  Patient was not in DKA.  She did not have a UTI.  Neuroexam was nonfocal.  Dizziness improved with meclizine.  She was able to ambulate without difficulty.  Dizziness is likely due to BPPV but hyperglycemia may also be contributing to this.  Patient will need to follow-up with her PCP to have her insulin regimen adjusted [WH]      ED Course User Index  [] Dani Nava MD         COMPLEXITY OF CARE  Admission was considered but after careful review of the patient's presentation, physical examination, diagnostic results, and response to treatment the patient may be safely  discharged with outpatient follow-up.      DIAGNOSIS  Final diagnoses:   Type 2 diabetes mellitus with hyperglycemia, with long-term current use of insulin   Dizziness         DISPOSITION  DISCHARGE    Patient discharged in stable condition.    Reviewed implications of results, diagnosis, meds, responsibility to follow up, warning signs and symptoms of possible worsening, potential complications and reasons to return to ER, including worsening/persistent symptoms or other concern.    Patient/Family voiced understanding of above instructions.    Discussed plan for discharge, as there is no emergent indication for admission. Patient referred to primary care provider for BP management due to today's BP. Pt/family is agreeable and understands need for follow up and repeat testing.  Pt is aware that discharge does not mean that nothing is wrong but it indicates no emergency is present that requires admission and they must continue care with follow-up as given below or physician of their choice.     FOLLOW-UP  Augusto Boykin MD  [address] [address] [phone]    Schedule an appointment as soon as possible for a visit            Medication List        New Prescriptions      meclizine 25 MG tablet  Commonly known as: ANTIVERT  Take 1 tablet by mouth 3 (Three) Times a Day As Needed for Dizziness.               Where to Get Your Medications        These medications were sent to [facility] [id number] - [facility] [address] [facility] - [phone]  - [phone]   [address] [facility] [address]      Phone: [phone]   meclizine 25 MG tablet                   Latest Documented Vital Signs:  AS OF 23:15 EDT VITALS:    BP - 133/91  HR - 72  TEMP - 97.5 °F (36.4 °C) (Tympanic)  O2 SATS - 97%            --    Please note that portions of this were completed with a voice recognition program.       Note Disclaimer: At [facility], we believe that sharing information builds  trust and better relationships. You are receiving this note because you are receiving care at [facility] or recently visited. It is possible you will see health information before a provider has talked with you about it. This kind of information can be easy to misunderstand. To help you fully understand what it means for your health, we urge you to discuss this note with your provider.             Dani Nava MD  04/28/25 1648

## 2025-04-29 LAB
QT INTERVAL: 416 MS
QTC INTERVAL: 426 MS

## 2025-04-29 NOTE — DISCHARGE INSTRUCTIONS
Check and record your blood sugar regularly.  Follow-up with your primary care provider as soon as possible.  Return to the emergency department for worsening/persistent symptoms, trouble walking, headache, chest pain, shortness of breath, vomiting, or other concern.